# Patient Record
(demographics unavailable — no encounter records)

---

## 2024-11-05 NOTE — CONSULT LETTER
[Dear  ___] : Dear  [unfilled], [Consult Letter:] : I had the pleasure of evaluating your patient, [unfilled]. [Please see my note below.] : Please see my note below. [Consult Closing:] : Thank you very much for allowing me to participate in the care of this patient.  If you have any questions, please do not hesitate to contact me. [Sincerely,] : Sincerely, [FreeTextEntry3] : Suzi Rogers MD [facility] physician partners Pediatric gastroenterologist , [facility] of medicine at [facility] [phone] [email or web address]

## 2024-11-05 NOTE — PHYSICAL EXAM
[Well Developed] : well developed [NAD] : in no acute distress [PERRL] : pupils were equal, round, reactive to light  [icteric] : anicteric [Moist & Pink Mucous Membranes] : moist and pink mucous membranes [CTAB] : lungs clear to auscultation bilaterally [Respiratory Distress] : no respiratory distress  [Regular Rate and Rhythm] : regular rate and rhythm [Normal S1, S2] : normal S1 and S2 [Soft] : soft  [Distended] : non distended [Tender] : non tender [Normal Bowel Sounds] : normal bowel sounds [No HSM] : no hepatosplenomegaly appreciated [Normal Tone] : normal tone [Well-Perfused] : well-perfused [Edema] : no edema [Cyanosis] : no cyanosis [Rash] : no rash [Jaundice] : no jaundice [Interactive] : interactive [FreeTextEntry1] : Noted to be itching throughout the exam. [de-identified] : Blanching dark circular spots on her legs, nontender

## 2024-11-05 NOTE — REASON FOR VISIT
[Consultation] : a consultation visit [Mother] : mother [Patient] : patient [FreeTextEntry2] : liver enzymes elevated.  Referred by Dr. Latham [[employer] ] : provided by [employer]   [Time Spent: ____ minutes] : Total time spent using  services: [unfilled] minutes. The patient's primary language is not English thus required  services. [Interpreters_IDNumber] : [id number] [TWNoteComboBox1] : Nigerien

## 2024-11-05 NOTE — ASSESSMENT
[Educated Patient & Family about Diagnosis] : educated the patient and family about the diagnosis [FreeTextEntry1] : ИВАН  is a 16 year old  here for consultation for Elevated alkaline phosphatase and pruritus done in the setting of evaluation of dark spots on her legs Alkaline phosphatase is 176 with the rest of LFTs being normal.  Differential diagnosis includes liver versus intestinal versus bone sources.  Alkaline phosphatase can also be elevated after eating a fatty meal.           Recommendations Labs: as below Recommend fragrance free soaps.  Recommend Aquaphor daily as a moisturizer  Referral to allergy immunology.  Would  Would consider referral to heme-onc for evaluation of these pigmented areas on the skin  Follow-up visit in 4 to 6 weeks   This note was done with a voice recognition transcription software and any typos are related to this rather than medical error.

## 2024-11-05 NOTE — CONSULT LETTER
[Dear  ___] : Dear  [unfilled], [Consult Letter:] : I had the pleasure of evaluating your patient, [unfilled]. [Please see my note below.] : Please see my note below. [Consult Closing:] : Thank you very much for allowing me to participate in the care of this patient.  If you have any questions, please do not hesitate to contact me. [Sincerely,] : Sincerely, [FreeTextEntry3] : Suzi Rogers MD [facility] physician partners Pediatric gastroenterologist , [facility] of medicine at [facility] [phone] [email or web address]

## 2024-11-05 NOTE — REASON FOR VISIT
[Consultation] : a consultation visit [Mother] : mother [Patient] : patient [FreeTextEntry2] : liver enzymes elevated.  Referred by Dr. Latham [[employer] ] : provided by [employer]   [Time Spent: ____ minutes] : Total time spent using  services: [unfilled] minutes. The patient's primary language is not English thus required  services. [Interpreters_IDNumber] : [id number] [TWNoteComboBox1] : Guyanese

## 2024-11-05 NOTE — HISTORY OF PRESENT ILLNESS
[de-identified] : ИВАН IYER , is  a 16 year old female here for initial consultation  For elevated alkaline phosphatase to the level of 176 on screening labs    She was seen by PCP for workup of dark spots on her legs.  Notable for the following  CBC notable for hemoglobin of 12, MCV 79, RDW 13.1 with normal platelet count.  Urinalysis was unremarkable.  INR 1.2.  CMP notable for alkaline phosphatase of 176, ALT 12, AST 18, albumin 4.1, total bilirubin less than 0.5.  Thyroid hormone levels are normal.  Also noted to have diffuse itchiness throughout her body for the last several months.  No real change in soaps or other detergents   Denies abdominal pain, nausea, vomiting, Jaundice, heartburn, chest pain or headaches  Stools are soft and daily with no blood or mucus noted

## 2024-11-05 NOTE — PHYSICAL EXAM
[Well Developed] : well developed [NAD] : in no acute distress [PERRL] : pupils were equal, round, reactive to light  [icteric] : anicteric [Moist & Pink Mucous Membranes] : moist and pink mucous membranes [CTAB] : lungs clear to auscultation bilaterally [Respiratory Distress] : no respiratory distress  [Regular Rate and Rhythm] : regular rate and rhythm [Normal S1, S2] : normal S1 and S2 [Soft] : soft  [Distended] : non distended [Tender] : non tender [Normal Bowel Sounds] : normal bowel sounds [No HSM] : no hepatosplenomegaly appreciated [Normal Tone] : normal tone [Well-Perfused] : well-perfused [Edema] : no edema [Cyanosis] : no cyanosis [Rash] : no rash [Jaundice] : no jaundice [Interactive] : interactive [FreeTextEntry1] : Noted to be itching throughout the exam. [de-identified] : Blanching dark circular spots on her legs, nontender

## 2024-11-05 NOTE — PAST MEDICAL HISTORY
[At ___ Weeks Gestation] : at [unfilled] weeks gestation [Normal Vaginal Route] : by normal vaginal route [FreeTextEntry1] : 8lbs

## 2024-11-05 NOTE — HISTORY OF PRESENT ILLNESS
[de-identified] : ИВАН IYER , is  a 16 year old female here for initial consultation  For elevated alkaline phosphatase to the level of 176 on screening labs    She was seen by PCP for workup of dark spots on her legs.  Notable for the following  CBC notable for hemoglobin of 12, MCV 79, RDW 13.1 with normal platelet count.  Urinalysis was unremarkable.  INR 1.2.  CMP notable for alkaline phosphatase of 176, ALT 12, AST 18, albumin 4.1, total bilirubin less than 0.5.  Thyroid hormone levels are normal.  Also noted to have diffuse itchiness throughout her body for the last several months.  No real change in soaps or other detergents   Denies abdominal pain, nausea, vomiting, Jaundice, heartburn, chest pain or headaches  Stools are soft and daily with no blood or mucus noted

## 2024-12-10 NOTE — REASON FOR VISIT
[Consultation Follow Up] : a consultation follow up  [Mother] : mother [FreeTextEntry2] : elevated liver enzymes [Other: ______] : provided by KEITH [Time Spent: ____ minutes] : Total time spent using  services: [unfilled] minutes. The patient's primary language is not English thus required  services. [Interpreters_IDNumber] : [id number] [Interpreters_FullName] : Ryan [TWNoteComboBox1] : Citizen of [place]

## 2024-12-10 NOTE — HISTORY OF PRESENT ILLNESS
[de-identified] : ИВАН IYER , is  a 16 year old female here for initial consultation  For elevated alkaline phosphatase to the level of 176 on screening labs    On review of labs done by hematology oncology at [facility] physicians is as follows Phosphorus 4.2 Iron: Percent saturation 7%.  Otherwise iron studies normal.  GGT normal. CMP notable for albumin 4.3, alkaline phosphatase 182, ALT 16, AST 22 ESR 31  Alkaline phosphatase isotype's Total serum liver isotype 206.  Liver 1 percentile 57.2 (high), liver one 117.8 Liver 2% normal.  Liver to 13. Bone percentile 27.7 (low) Total value 57.1.  Intestine percentile 8.8% (upper limit of normal 8.7) Bone percentile normal Intestine 18.1  Serum zinc normal Vitamin C normal, CMP IgM negative  EBV IgM negative EBV IgG 525  EBV nuclear antibody IgG greater than 600 EBV early antigen antibody negative EBV capsid IgG positive, IgM negative CMV IgG positive CMV IgM negatve Ferritin 33.9 CRP 0.8 Coags normal Reticulocyte count 1.3 CBC normal  Seen by hematology oncology.  The bruising processes seem to have resolved. Continues to have pruritus.  Improved with fragrance free soaps and nonscented lotions.  Does use Benadryl and cetirizine as needed  Continues to have dry skin. Pruritus seems to be worse at home.  Better at school.  Denies abdominal pain, nausea, vomiting, Jaundice, heartburn, chest pain or headaches Stools are soft and daily with no blood or mucus noted

## 2024-12-10 NOTE — PHYSICAL EXAM
[Well Developed] : well developed [NAD] : in no acute distress [PERRL] : pupils were equal, round, reactive to light  [icteric] : anicteric [Moist & Pink Mucous Membranes] : moist and pink mucous membranes [CTAB] : lungs clear to auscultation bilaterally [Respiratory Distress] : no respiratory distress  [Regular Rate and Rhythm] : regular rate and rhythm [Normal S1, S2] : normal S1 and S2 [Soft] : soft  [Distended] : non distended [Tender] : non tender [Normal Bowel Sounds] : normal bowel sounds [No HSM] : no hepatosplenomegaly appreciated [Normal Tone] : normal tone [Well-Perfused] : well-perfused [Edema] : no edema [Cyanosis] : no cyanosis [Rash] : no rash [Jaundice] : no jaundice [Interactive] : interactive [de-identified] : Mildly dry skin noted.  Some excoriation noted.  No eczematous patches noted

## 2024-12-10 NOTE — ASSESSMENT
[Educated Patient & Family about Diagnosis] : educated the patient and family about the diagnosis [FreeTextEntry1] : ИВАН  is a 16 year old  here for consultation for Elevated alkaline phosphatase and pruritus done in the setting of evaluation of dark spots on her legs. Has been evaluated by hematology.  The dark spots have resolved.  Coagulation workup normal.  Continues to have elevated alk phos to 182.  Alkaline phosphatase isotype from the liver is elevated.  Pruritus continues but has improved with fragrance free products   Differential diagnosis for these findings includes bile acid synthesis defect, PFIC, PSC, or or idiopathic   Repeat LFTs along with serum bile acids  Liver ultrasound  If elevated bile acids would likely do genetic panel for cholestasis.  Consider MRCP if needed as well  Follow-up visit in 3 to 4 weeks.  Will obtain allergy labs as well  That were already drawn. Follow-up with allergy consultation done at [facility] physicians I spent 40 mins face-to-face  along with  reviewing relevant imaging, labs and coordination of care    This note was done with a voice recognition transcription software and any typos are related to this rather than medical error.

## 2024-12-10 NOTE — CONSULT LETTER
[Dear  ___] : Dear  [unfilled], [Consult Letter:] : I had the pleasure of evaluating your patient, [unfilled]. [Please see my note below.] : Please see my note below. [Consult Closing:] : Thank you very much for allowing me to participate in the care of this patient.  If you have any questions, please do not hesitate to contact me. [Sincerely,] : Sincerely, [FreeTextEntry3] : Suzi Rogers MD [facility] physician partners Pediatric gastroenterologist , [facility] of medicine at [facility] [phone] [email or web address]

## 2025-01-07 NOTE — REASON FOR VISIT
[[employer] ] : provided by [employer]   [FreeTextEntry2] : elevated alkaline phosphatase  levels [Time Spent: ____ minutes] : Total time spent using  services: [unfilled] minutes. The patient's primary language is not English thus required  services. [Interpreters_IDNumber] : [id number] [Interpreters_FullName] : judith [TWNoteComboBox1] : Bahamian

## 2025-01-07 NOTE — PHYSICAL EXAM
[Well Developed] : well developed [NAD] : in no acute distress [PERRL] : pupils were equal, round, reactive to light  [icteric] : anicteric [Moist & Pink Mucous Membranes] : moist and pink mucous membranes [CTAB] : lungs clear to auscultation bilaterally [Respiratory Distress] : no respiratory distress  [Regular Rate and Rhythm] : regular rate and rhythm [Normal S1, S2] : normal S1 and S2 [Soft] : soft  [Distended] : non distended [Tender] : non tender [Normal Bowel Sounds] : normal bowel sounds [No HSM] : no hepatosplenomegaly appreciated [Normal Tone] : normal tone [Well-Perfused] : well-perfused [Edema] : no edema [Cyanosis] : no cyanosis [Rash] : no rash [Dry Skin] : dry skin [Jaundice] : no jaundice [Interactive] : interactive [de-identified] : Improved skin.  Some dry skin noted but better than previously

## 2025-01-07 NOTE — HISTORY OF PRESENT ILLNESS
[de-identified] : ИВАН IYER , is  a 16 year old female here for follow-up visit of elevated alkaline phosphatase to the level of 176 on screening labs   Repeat LFTS noted and alkaline phosphatase of 172 with otherwise normal liver function test.  Total bile acids normal.  Liver ultrasound also normal.  Previous labs done by hematology are as follows On review of labs done by hematology oncology at [facility] physicians is as follows Phosphorus 4.2 Iron: Percent saturation 7%.  Otherwise iron studies normal.  GGT normal. CMP notable for albumin 4.3, alkaline phosphatase 182, ALT 16, AST 22  Alkaline phosphatase isotype's Total serum liver isotype 206.  Liver 1 percentile 57.2 (high), liver one 117.8 Liver 2% normal.  Liver to 13. Bone percentile 27.7 (low) Total value 57.1.  Intestine percentile 8.8% (upper limit of normal 8.7) Bone percentile normal Intestine 18.1  Overall, itching has improved. mom's aunt is a dermatologist. recommended some creams that have been helping Using Cerave ointment and lotion. pruritis is  much improved with those creams.   not on any other meds  Mom noted that itching is worse when places are more heated  Denies abdominal pain, nausea, vomiting, Jaundice, heartburn, chest pain or headaches Stools are soft and daily with no blood or mucus noted [de-identified] :   Serum zinc normal Vitamin C normal, CMP IgM negative  EBV IgM negative EBV IgG 525  EBV nuclear antibody IgG greater than 600 EBV early antigen antibody negative EBV capsid IgG positive, IgM negative CMV IgG positive CMV IgM negatve Ferritin 33.9 CRP 0.8 Coags normal Reticulocyte count 1.3 CBC normal  Seen by hematology oncology.  The bruising processes seem to have resolved.

## 2025-01-07 NOTE — CONSULT LETTER
[Dear  ___] : Dear  [unfilled], [Consult Letter:] : I had the pleasure of evaluating your patient, [unfilled]. [Please see my note below.] : Please see my note below. [Consult Closing:] : Thank you very much for allowing me to participate in the care of this patient.  If you have any questions, please do not hesitate to contact me. [Sincerely,] : Sincerely, [FreeTextEntry3] : Suzi Rogers MD [facility] physician partners Pediatric gastroenterologist , [facility] of medicine at [facility] [phone] [email or web address]

## 2025-01-07 NOTE — ASSESSMENT
[Educated Patient & Family about Diagnosis] : educated the patient and family about the diagnosis [FreeTextEntry1] : ИВАН  is a 16 year old  here for consultation for Elevated alkaline phosphatase and pruritus done in the setting of evaluation of dark spots on her legs. Has been evaluated by hematology.  The dark spots have resolved.  Coagulation workup normal.  Repeat alkaline phosphatase is 172.  Serum bile acids normal.  Liver ultrasound normal.  Previous lab workup noted elevated liver alkaline phosphatase isotype.  Pruritus has improved with better skin care regiment. Overall the workup for elevated alkaline phosphatase only noted mildly elevated alkaline phosphatase of hepatic origin however alkaline phosphatase is not more than 2 times normal.  Ultrasound has been normal.  She is asymptomatic.  Given these findings we will just observe and repeat testing in 6 months.   Labs as below in 6 months  Continue aggressive skin care regiment  Follow-up in 6 months or as needed